# Patient Record
(demographics unavailable — no encounter records)

---

## 2025-03-11 NOTE — HISTORY OF PRESENT ILLNESS
[de-identified] : Patient referred by Dr. Dunn for evaluation of multinodular goiter.  Patient was noted to have thyroid nodules over 20 years ago.  Thyroid biopsies atypical.  Reports not available.  Patient had not followed up.  2 weeks ago patient experienced sore throat, denies dysphagia, change in voice, shortness of breath or radiation exposure.  Was recently started on Vistaril for sleep which resulted in a dry mouth which she has recently stopped.  Blood work February 2024: Calcium 10.1, TSH 0.3, free T4 1.2. FNA right lower 2 cm nodule 4/2024 AUS, negative thyroseq.  repeat US 9/2024 stable MNG. Patient on mounjorno for weight loss. Repeat US 3/2025 with right lateral and new left nodules TR5, previously biopsied nodule stable. Denies recent illness. I have reviewed all old and new data and available images.

## 2025-03-11 NOTE — PHYSICAL EXAM
[de-identified] : no cervical or supraclavicular adenopathy, trachea midline, thyroid full without discrete palpable mass [Normal] : no neck adenopathy [de-identified] : Skin:  normal appearance.  no rash, nodules, vesicles, or erythema, Musculoskeletal:  full range of motion and no deformities appreciated Neurological:  grossly intact Psychiatric:  oriented to person, place and time with appropriate affect

## 2025-03-11 NOTE — ASSESSMENT
[FreeTextEntry1] : Patient with long history of multinodular goiter with prior atypical biopsies with neg genetics recently.  changes on US 3/2025,  will obtain bilateral FNA and contact office, if benign. repeat US 9/2025, RTO  6mo. Patient is considering total thyroidecotmy for definitve diagnosis.  I have reviewed the pathophysiology of the disease process, the area anatomy and the rationale for surgery.  I discussed the risks, benefits and alternative treatments which include but are not limited to bleeding, infection, numbness, hoarseness, hypocalcemia, scarring, and need for reoperation.  I have answered the patient's questions to their satisfaction.

## 2025-06-09 NOTE — PHYSICAL EXAM
[Well Developed] : well developed [Well Nourished] : well nourished [No Acute Distress] : no acute distress [Normal Conjunctiva] : normal conjunctiva [Normal Venous Pressure] : normal venous pressure [No Carotid Bruit] : no carotid bruit [Normal S1, S2] : normal S1, S2 [No Rub] : no rub [No Gallop] : no gallop [Clear Lung Fields] : clear lung fields [Good Air Entry] : good air entry [No Respiratory Distress] : no respiratory distress  [Soft] : abdomen soft [Non Tender] : non-tender [No Masses/organomegaly] : no masses/organomegaly [Normal Bowel Sounds] : normal bowel sounds [Normal Gait] : normal gait [No Edema] : no edema [No Clubbing] : no clubbing [No Cyanosis] : no cyanosis [No Rash] : no rash [No Varicosities] : no varicosities [No Skin Lesions] : no skin lesions [No Focal Deficits] : no focal deficits [Moves all extremities] : moves all extremities [Normal Speech] : normal speech [Alert and Oriented] : alert and oriented [Normal memory] : normal memory [de-identified] : +II/VI Murmur

## 2025-06-09 NOTE — HISTORY OF PRESENT ILLNESS
[FreeTextEntry1] : Dear Jenny,   I had the pleasure of seeing your patient AIMEE LONDON for Cardiometabolic evaluation.   As you know, she  is a Pleasant, 59 year old with a past medical history of Hyperlipidemia (Son with Lpa 800 nMol/L) (FHx Mother ASCVD ); Also Elevated Lpa 194 nMol/L; LDLc 113 On Atorva) =============== =============== Hyperlipidemia (Son with Lpa 800 nMol/L) (FHx Mother ASCVD ); Also Elevated Lpa 194 nMol/L; LDLc 113 On Atorva) - Discussed Diet & Exercise - Discussed Family Screening - Continue Atorva 20              - Highest Tolerated Atorva 20 And Cannot Tolerate Ezetimibe Due to Myalgias and Not at goal               - Start Repatha  - Check CT Calcium Score - Check Carotid US  Mn Systolic Murmur (holosystolic) I.VI apical c/f MR AND WELLS New - TTE  Patient reports chest pain, localization to the sternum, radiation to the neck/jaw, no alleviating nor worsening precipitants to CP, no assoc symptoms to CP - CTA Cors   Overweight  - GLP1RA by other MD     CC: Heart Issues - Patient reports chest pain, localization to the sternum, radiation to the neck/jaw, no alleviating nor worsening precipitants to CP, no assoc symptoms to CP - Patient notes no associated SOB/Palps/Leg swelling - Reports No associated F/C/N/V/Headaches - Reports Normal Exercise Tolerance - Reports no medication changes - Reports normal mood/quality of life - Reports no associated midnight awakenings from cP - Reports no diet changes - Reports no associated body aches - Reports no recent colds/viruses - Recent labs/imaging reviewed - Relevant Family history reviewed Mother/Father - CV Risk Assessment for 10 Year ACC/AHA Pooled Risk Cohort Equation places this person at < 7.5% Risk of ASCVD

## 2025-06-09 NOTE — DISCUSSION/SUMMARY
[FreeTextEntry1] : In summary  Pleasant, 59 year old with a past medical history of Hyperlipidemia (Son with Lpa 800 nMol/L) (FHx Mother ASCVD ); Also Elevated Lpa 194 nMol/L; LDLc 113 On Atorva) =============== =============== Hyperlipidemia (Son with Lpa 800 nMol/L) (FHx Mother ASCVD ); Also Elevated Lpa 194 nMol/L; LDLc 113 On Atorva) - Discussed Diet & Exercise - Discussed Family Screening - Continue Atorva 20              - Highest Tolerated Atorva 20 And Cannot Tolerate Ezetimibe Due to Myalgias and Not at goal               - Start Repatha  - Check CT Calcium Score - Check Carotid US  Mn Systolic Murmur (holosystolic) I.VI apical c/f MR AND WELLS New - TTE  Patient reports chest pain, localization to the sternum, radiation to the neck/jaw, no alleviating nor worsening precipitants to CP, no assoc symptoms to CP - Cannot walk due to ankle injury- CP Intermittent - difficulty walking - Cannot do chemical stress due to adverse response to vasodilators previously - excessive, unsafe drop in BP due Regadenoson and Persantine - Cannot do Dobutamine Echo due to baseline PVCs and arrhythmia potential - Check CTA Cors - CTA Cors   Overweight  - GLP1RA by other MD Alvarado, kind thanks for the referral.   Raad La MD [facility] PAUL BEAN Director, Preventive Cardiology [facility] Cardiovascular [place]                                                                                                                                                                                                                                                 --                                                                                                                                                                                                                                                                                                                                                                                                                                                                                                                                                                                                                                                                                                                                              ----------- 23 minutes was provided for preventive counseling on healthy diet, weight maintenance, CV risk reduction. Specifically, and separate from other cardiovascular evaluation and treatment, we discussed h willingness to focus  on lifestyle changes, particularly dietary; including greater consumption of vegetables, fruits over saturated fats. We discussed appropriate follow up to monitor progress on these areas.     -                                        [EKG obtained to assist in diagnosis and management of assessed problem(s)] : EKG obtained to assist in diagnosis and management of assessed problem(s)

## 2025-06-09 NOTE — PHYSICAL EXAM
[Well Developed] : well developed [Well Nourished] : well nourished [No Acute Distress] : no acute distress [Normal Conjunctiva] : normal conjunctiva [Normal Venous Pressure] : normal venous pressure [No Carotid Bruit] : no carotid bruit [Normal S1, S2] : normal S1, S2 [No Rub] : no rub [No Gallop] : no gallop [Clear Lung Fields] : clear lung fields [Good Air Entry] : good air entry [No Respiratory Distress] : no respiratory distress  [Soft] : abdomen soft [Non Tender] : non-tender [No Masses/organomegaly] : no masses/organomegaly [Normal Bowel Sounds] : normal bowel sounds [Normal Gait] : normal gait [No Edema] : no edema [No Cyanosis] : no cyanosis [No Clubbing] : no clubbing [No Varicosities] : no varicosities [No Rash] : no rash [No Skin Lesions] : no skin lesions [Moves all extremities] : moves all extremities [No Focal Deficits] : no focal deficits [Alert and Oriented] : alert and oriented [Normal Speech] : normal speech [Normal memory] : normal memory [de-identified] : +II/VI Murmur

## 2025-06-09 NOTE — DISCUSSION/SUMMARY
[FreeTextEntry1] : In summary  Pleasant, 59 year old with a past medical history of Hyperlipidemia (Son with Lpa 800 nMol/L) (FHx Mother ASCVD ); Also Elevated Lpa 194 nMol/L; LDLc 113 On Atorva) =============== =============== Hyperlipidemia (Son with Lpa 800 nMol/L) (FHx Mother ASCVD ); Also Elevated Lpa 194 nMol/L; LDLc 113 On Atorva) - Discussed Diet & Exercise - Discussed Family Screening - Continue Atorva 20              - Highest Tolerated Atorva 20 And Cannot Tolerate Ezetimibe Due to Myalgias and Not at goal               - Start Repatha  - Check CT Calcium Score - Check Carotid US  Mn Systolic Murmur (holosystolic) I.VI apical c/f MR AND WELLS New - TTE  Patient reports chest pain, localization to the sternum, radiation to the neck/jaw, no alleviating nor worsening precipitants to CP, no assoc symptoms to CP - Cannot walk due to ankle injury- CP Intermittent - difficulty walking - Cannot do chemical stress due to adverse response to vasodilators previously - excessive, unsafe drop in BP due Regadenoson and Persantine - Cannot do Dobutamine Echo due to baseline PVCs and arrhythmia potential - Check CTA Cors - CTA Cors   Overweight  - GLP1RA by other MD Alvarado, kind thanks for the referral.   Raad La MD [facility] PA[place] BEAN Director, Preventive Cardiology [facility] Cardiovascular [place]                                                                                                                                                                                                                                                 --                                                                                                                                                                                                                                                                                                                                                                                                                                                                                                                                                                                                                                                                                                                                              ----------- 23 minutes was provided for preventive counseling on healthy diet, weight maintenance, CV risk reduction. Specifically, and separate from other cardiovascular evaluation and treatment, we discussed h willingness to focus  on lifestyle changes, particularly dietary; including greater consumption of vegetables, fruits over saturated fats. We discussed appropriate follow up to monitor progress on these areas.     -                                        [EKG obtained to assist in diagnosis and management of assessed problem(s)] : EKG obtained to assist in diagnosis and management of assessed problem(s)

## 2025-06-26 NOTE — PHYSICAL EXAM
[Well Developed] : well developed [Well Nourished] : well nourished [No Acute Distress] : no acute distress [Normal Conjunctiva] : normal conjunctiva [Normal Venous Pressure] : normal venous pressure [No Carotid Bruit] : no carotid bruit [Normal S1, S2] : normal S1, S2 [No Rub] : no rub [No Gallop] : no gallop [Clear Lung Fields] : clear lung fields [Good Air Entry] : good air entry [No Respiratory Distress] : no respiratory distress  [Soft] : abdomen soft [Non Tender] : non-tender [No Masses/organomegaly] : no masses/organomegaly [Normal Bowel Sounds] : normal bowel sounds [Normal Gait] : normal gait [No Edema] : no edema [No Cyanosis] : no cyanosis [No Clubbing] : no clubbing [No Varicosities] : no varicosities [No Rash] : no rash [No Skin Lesions] : no skin lesions [Moves all extremities] : moves all extremities [No Focal Deficits] : no focal deficits [Normal Speech] : normal speech [Alert and Oriented] : alert and oriented [Normal memory] : normal memory [de-identified] : +II/VI Murmur

## 2025-06-26 NOTE — DISCUSSION/SUMMARY
[FreeTextEntry1] : In summary  Pleasant, 59 year old with a past medical history of Hyperlipidemia (Son with Lpa 800 nMol/L) (FHx Mother ASCVD ); Also Elevated Lpa 194 nMol/L; LDLc 113 On Atorva) =============== =============== Hyperlipidemia (Son with Lpa 800 nMol/L) (FHx Mother ASCVD ); Also Elevated Lpa 194 nMol/L; LDLc 113 On Atorva) - Discussed Diet & Exercise - Discussed Family Screening - Continue Atorva 20              - Highest Tolerated Atorva 20 And Cannot Tolerate Ezetimibe Due to Myalgias and Not at goal               - Start Repatha  - Check CT Calcium Score - Check Carotid US  Mn Systolic Murmur (holosystolic) I.VI apical c/f MR AND WELLS New - TTE  Patient reports chest pain, localization to the sternum, radiation to the neck/jaw, no alleviating nor worsening precipitants to CP, no assoc symptoms to CP - Cannot walk due to ankle injury- CP Intermittent - difficulty walking - Cannot do chemical stress due to adverse response to vasodilators previously - excessive, unsafe drop in BP due Regadenoson and Persantine - Cannot do Dobutamine Echo due to baseline PVCs and arrhythmia potential - Check CTA Cors - CTA Cors   Overweight  - GLP1RA by other MD Alvarado, kind thanks for the referral.   Raad La MD [facility] PAUL BEAN Director, Preventive Cardiology [facility] Cardiovascular [place]                                                                                                                                                                                                                                                 --